# Patient Record
Sex: FEMALE | Race: OTHER | HISPANIC OR LATINO | URBAN - METROPOLITAN AREA
[De-identification: names, ages, dates, MRNs, and addresses within clinical notes are randomized per-mention and may not be internally consistent; named-entity substitution may affect disease eponyms.]

---

## 2017-12-27 PROBLEM — Z00.00 ENCOUNTER FOR PREVENTIVE HEALTH EXAMINATION: Status: ACTIVE | Noted: 2017-12-27

## 2018-11-05 ENCOUNTER — EMERGENCY (EMERGENCY)
Facility: HOSPITAL | Age: 38
LOS: 1 days | Discharge: HOME | End: 2018-11-05
Attending: EMERGENCY MEDICINE
Payer: MEDICAID

## 2018-11-05 VITALS
DIASTOLIC BLOOD PRESSURE: 65 MMHG | SYSTOLIC BLOOD PRESSURE: 111 MMHG | TEMPERATURE: 98 F | OXYGEN SATURATION: 100 % | RESPIRATION RATE: 18 BRPM | HEART RATE: 61 BPM

## 2018-11-05 VITALS — HEIGHT: 62 IN | WEIGHT: 123.9 LBS

## 2018-11-05 LAB
APPEARANCE UR: CLEAR — SIGNIFICANT CHANGE UP
BILIRUB UR-MCNC: NEGATIVE — SIGNIFICANT CHANGE UP
COLOR SPEC: YELLOW — SIGNIFICANT CHANGE UP
DIFF PNL FLD: NEGATIVE — SIGNIFICANT CHANGE UP
EPI CELLS # UR: SIGNIFICANT CHANGE UP
GLUCOSE UR QL: NEGATIVE MG/DL — SIGNIFICANT CHANGE UP
HCG UR QL: POSITIVE
KETONES UR-MCNC: NEGATIVE — SIGNIFICANT CHANGE UP
LEUKOCYTE ESTERASE UR-ACNC: NEGATIVE — SIGNIFICANT CHANGE UP
NITRITE UR-MCNC: NEGATIVE — SIGNIFICANT CHANGE UP
PH UR: 8 — SIGNIFICANT CHANGE UP (ref 5–8)
PROT UR-MCNC: 15 MG/DL
SP GR SPEC: 1.01 — SIGNIFICANT CHANGE UP (ref 1.01–1.02)
UROBILINOGEN FLD QL: 1 MG/DL
WBC UR QL: SIGNIFICANT CHANGE UP

## 2018-11-05 PROCEDURE — 81001 URINALYSIS AUTO W/SCOPE: CPT

## 2018-11-05 PROCEDURE — 87186 SC STD MICRODIL/AGAR DIL: CPT

## 2018-11-05 PROCEDURE — 99283 EMERGENCY DEPT VISIT LOW MDM: CPT

## 2018-11-05 PROCEDURE — 81025 URINE PREGNANCY TEST: CPT

## 2018-11-05 PROCEDURE — 87086 URINE CULTURE/COLONY COUNT: CPT

## 2018-11-05 PROCEDURE — T1013: CPT

## 2018-11-05 NOTE — ED ADULT NURSE NOTE - NSIMPLEMENTINTERV_GEN_ALL_ED
Implemented All Universal Safety Interventions:  [place] to call system. Call bell, personal items and telephone within reach. Instruct patient to call for assistance. Room bathroom lighting operational. Non-slip footwear when patient is off stretcher. Physically safe environment: no spills, clutter or unnecessary equipment. Stretcher in lowest position, wheels locked, appropriate side rails in place.

## 2018-11-05 NOTE — ED STATDOCS - NS_ ATTENDINGSCRIBEDETAILS _ED_A_ED_FT
I, Kwame Oconnor, performed the initial face to face bedside interview with this patient regarding history of present illness, review of symptoms and relevant past medical, social and family history.  I completed an independent physical examination.  I was the provider who initially evaluated this patient.  The history, relevant review of systems, past medical and surgical history, medical decision making, and physical examination was documented by the scribe in my presence and I attest to the accuracy of the documentation. Follow-up on ordered tests (ie labs, radiologic studies) and re-evaluation of the patient's status has been communicated to the ACP.  Disposition of the patient will be based on test outcome and response to ED interventions.

## 2018-11-05 NOTE — ED ADULT TRIAGE NOTE - CHIEF COMPLAINT QUOTE
Patient arrived to ED today with c/o UTI.  Patient was seen at clinic on Thursday, urine was collected, and patient was told to come to ED for treatment.  Patient states she is 7 weeks pregnant.  Patient c/o fever, back pain, and vomiting.

## 2018-11-05 NOTE — ED STATDOCS - OBJECTIVE STATEMENT
37 y/o F pt 7 weeks pregnant presents to ED sent by [facility] for urine infection. Pt c/o b/l flank pain x 2 weeks. Pt states she had subjective fever last week. Pt went to [facility] last week and was called last night to go to ED for UTI. Also had usual morning sickness. Denies dysuria. LMP . NKDA. Nonsmoker.    002  ; Lovely 37 y/o F pt 7 weeks pregnant presents to ED sent by [facility] for urine infection. Pt was seen last week at [facility]  with bl flnak pain for 2 weeks and feeling feverish on Mon, tues and Wed.  Had urine specimen which subsequently showed bacteria.  Reports no symptoms since visit to [facility] last week.  denies fever, dysuria, frequency.  received called last night advising to go to ED for UTI.  Experiencing morning sickness. LMP . NKDA. Nonsmoker.    002  ; Lovely

## 2018-11-05 NOTE — ED STATDOCS - PROGRESS NOTE DETAILS
PT evaluated by intake physician. HPI/PE/ROS as noted above. Will follow up plan per intake physician UA and urine culture results reviewed. No signs of infection. Abdomen soft, non-tender, no rebound or guarding. No b/l CVA tenderness.

## 2018-12-10 ENCOUNTER — APPOINTMENT (OUTPATIENT)
Dept: ANTEPARTUM | Facility: CLINIC | Age: 38
End: 2018-12-10
Payer: MEDICAID

## 2018-12-10 ENCOUNTER — ASOB RESULT (OUTPATIENT)
Age: 38
End: 2018-12-10

## 2018-12-10 PROCEDURE — 76801 OB US < 14 WKS SINGLE FETUS: CPT

## 2018-12-17 NOTE — ED ADULT NURSE NOTE - OBJECTIVE STATEMENT
Home Med List is complete. Source of medications in list is pt interview and review of patient's Rx bottles.     Patient states she took all her am meds today. She took a prn dose of pepcid this am.     Please note:  1. Removed from pt's med list: dexamethasone 1 mg tab (prior to labwork), hydrocortisone cream, tamadol 50 mg Q8H prn  2.  Added to pt's med list: famotidine    Ismael Gifford  PharmD Candidate 2019 12/16/2018 7:20 PM pt AOX4 was told to come to ED by clinic for urinary symptoms, pt states she had back pain but has resolved and had an abnormal result in the clinic.

## 2019-02-12 ENCOUNTER — APPOINTMENT (OUTPATIENT)
Dept: ANTEPARTUM | Facility: CLINIC | Age: 39
End: 2019-02-12

## 2019-02-25 ENCOUNTER — APPOINTMENT (OUTPATIENT)
Age: 39
End: 2019-02-25
Payer: MEDICAID

## 2019-02-25 ENCOUNTER — ASOB RESULT (OUTPATIENT)
Age: 39
End: 2019-02-25

## 2019-02-25 PROCEDURE — 76811 OB US DETAILED SNGL FETUS: CPT

## 2019-04-01 ENCOUNTER — APPOINTMENT (OUTPATIENT)
Dept: MATERNAL FETAL MEDICINE | Facility: CLINIC | Age: 39
End: 2019-04-01
Payer: MEDICAID

## 2019-04-01 ENCOUNTER — ASOB RESULT (OUTPATIENT)
Age: 39
End: 2019-04-01

## 2019-04-01 VITALS — WEIGHT: 153.5 LBS | HEIGHT: 60 IN | BODY MASS INDEX: 30.14 KG/M2

## 2019-04-01 PROCEDURE — G0108 DIAB MANAGE TRN  PER INDIV: CPT

## 2019-04-01 RX ORDER — URINE ACETONE TEST STRIPS
STRIP MISCELLANEOUS
Qty: 1 | Refills: 0 | Status: ACTIVE | COMMUNITY
Start: 2019-04-01 | End: 1900-01-01

## 2019-04-15 ENCOUNTER — APPOINTMENT (OUTPATIENT)
Dept: ANTEPARTUM | Facility: CLINIC | Age: 39
End: 2019-04-15

## 2019-04-15 ENCOUNTER — ASOB RESULT (OUTPATIENT)
Age: 39
End: 2019-04-15

## 2019-04-15 ENCOUNTER — APPOINTMENT (OUTPATIENT)
Dept: MATERNAL FETAL MEDICINE | Facility: CLINIC | Age: 39
End: 2019-04-15
Payer: MEDICAID

## 2019-04-15 ENCOUNTER — APPOINTMENT (OUTPATIENT)
Age: 39
End: 2019-04-15
Payer: MEDICAID

## 2019-04-15 VITALS
BODY MASS INDEX: 30.51 KG/M2 | HEIGHT: 60 IN | SYSTOLIC BLOOD PRESSURE: 90 MMHG | OXYGEN SATURATION: 98 % | HEART RATE: 90 BPM | DIASTOLIC BLOOD PRESSURE: 56 MMHG | WEIGHT: 155.38 LBS | RESPIRATION RATE: 16 BRPM

## 2019-04-15 DIAGNOSIS — O09.523 SUPERVISION OF ELDERLY MULTIGRAVIDA, THIRD TRIMESTER: ICD-10-CM

## 2019-04-15 DIAGNOSIS — O99.013 ANEMIA COMPLICATING PREGNANCY, THIRD TRIMESTER: ICD-10-CM

## 2019-04-15 DIAGNOSIS — O34.219 MATERNAL CARE FOR UNSPECIFIED TYPE SCAR FROM PREVIOUS CESAREAN DELIVERY: ICD-10-CM

## 2019-04-15 DIAGNOSIS — Z64.1 PROBLEMS RELATED TO MULTIPARITY: ICD-10-CM

## 2019-04-15 DIAGNOSIS — Z86.2 PERSONAL HISTORY OF DISEASES OF THE BLOOD AND BLOOD-FORMING ORGANS AND CERTAIN DISORDERS INVOLVING THE IMMUNE MECHANISM: ICD-10-CM

## 2019-04-15 DIAGNOSIS — O99.213 OBESITY COMPLICATING PREGNANCY, THIRD TRIMESTER: ICD-10-CM

## 2019-04-15 PROCEDURE — 76816 OB US FOLLOW-UP PER FETUS: CPT

## 2019-04-15 PROCEDURE — 99215 OFFICE O/P EST HI 40 MIN: CPT | Mod: TH

## 2019-04-15 PROCEDURE — 76819 FETAL BIOPHYS PROFIL W/O NST: CPT

## 2019-04-15 RX ORDER — CHLORHEXIDINE GLUCONATE 4 %
325 (65 FE) LIQUID (ML) TOPICAL
Refills: 0 | Status: ACTIVE | COMMUNITY

## 2019-04-15 RX ORDER — VITAMIN C, CALCIUM, IRON, VITAMIN D3, VITAMIN E, VITAMIN B1, VITAMIN B2, VITAMIN B3, VITAMIN B6, FOLIC ACID, IODINE, ZINC, COPPER, DOCUSATE SODIUM, DOCOSAHEXAENOIC ACID (DHA) 27-1-50 MG
KIT ORAL
Refills: 0 | Status: ACTIVE | COMMUNITY

## 2019-04-15 NOTE — DISCUSSION/SUMMARY
[FreeTextEntry1] : She is 31 weeks and 2 days gestation by her last menstrual period dates.\par \par Regarding her advanced maternal age, she did not have genetic counseling. She did not have prenatal diagnostic testing. She told me that she prenatal screen tests which were not available for my review. She told me that she had a detailed fetal anatomy ultrasound examination during the second trimester that reported normal fetal anatomy. I told her that advanced maternal age has been associated with a higher incidence of gestational diabetes, and preeclampsia /eclampsia. I also told her that advanced maternal age has been associated with an increased risk of stillbirth, and therefore, I recommend delivery during the 39 week of gestation in the event she has not given birth by 39 weeks of gestation. \par \par She is overweight and obesity has been associated with a number of maternal complications such as gestational diabetes, pre-eclampsia, thrombophlebitis, labor abnormalities, post-term pregnancies,  delivery, and operative complications. Obesity has been associated with adverse fetal outcomes such as late stillbirth and  deliveries.  Obese women also have a two to three-fold increased incidence in congenital anomalies. There were no major fetal malformations seen during the fetal anatomy ultrasound examination. \par \par Regarding her gestational diabetes, she states that she has been following a diabetic diet. She performs fasting and 1 hour postprandial self glucose monitoring.  My review of her log book from 19 to 4/15/19 revealed all glucose values to be within normal limits. She appears to be in good glycemic control. I informed her that maintaining euglycemia is the most important factor associated with good  outcomes in pregnancies complicated by gestational diabetes. I told her that poor glucose control may cause fetal macrosomia, shoulder dystocia,  delivery,  respiratory distress syndrome and  metabolic complications such as hypoglycemia and hyperbilirubinemia.  I told her to eat three daily meals with 3 snacks to reduce postprandial glucose fluctuations. I told her that she is at risk for developing gestational hypertension and preeclampsia during the current pregnancy. I also told her that she is at risk for developing type 2 diabetes, metabolic syndrome and cardiovascular disease later in life.  I also recommend a 75 gram 2 hour OGTT approximately 6 - 8 weeks postpartum to determine whether she has impaired glucose tolerance or preexisting diabetes not diagnosed prior to the pregnancy. I made arrangements for her to see our diabetes educator on 19.\par \par Regarding her anemia during pregnancy, I advised her to continue taking her prenatal vitamin and the prescribed iron supplementation. She was advised to increase her vitamin C intake. I told her that red meat, poultry, and fish are dietary sources of iron. I told her that anemia during pregnancy increases the risk of having a  delivery or a low birth weight baby.\par \par She had 2  sections and she was advised to have a repeat  section delivery.  I told her that multiple repeat  deliveries can result in serious maternal morbidity.  I told her that she is at risk for dense adhesions, more difficult surgery, placenta accreta, bowel injury, cystotomy, ureteral injury, ileus, hysterectomy, blood transfusions, intensive care unit admission, and  delivery.\par \par She is multiparous and we discussed the various methods available for contraception. She told me that she wants to have an operative sterilization procedure after the  delivery. \par \par I recommend the Tdap vaccine between 27 and 36 weeks of gestation to prevent pertussis infection in the  infant. I recommend the flu vaccine during flu season (October through May). She should have serial ultrasounds to evaluate fetal growth and development.  I also suggest fetal surveillance during the third trimester of pregnancy with weekly NSTs or BPPs starting at 36 weeks gestation.  She can also perform daily fetal movement counts as an adjunct to the NSTs or BPPs.\par \par

## 2019-04-15 NOTE — OB HISTORY
[Pregnancy History] : patient received anesthesia [Spontaneous] : Spontaneous conception [Sonogram] : sonogram [at ___ wks] : at [unfilled] weeks [Definite:  ___ (Date)] : the last menstrual period was [unfilled] [___] : no pregnancy complications reported [LAURI: ___] : LAURI: [unfilled] [LMP: ___] : LMP: [unfilled] [EGA: ___ wks] : EGA: [unfilled] wks [FreeTextEntry1] : Her prenatal records were not available for my review. She told me that she started her prenatal care at the [facility] and transferred her care to Dr. White during February 2019.\par \par A three-hour glucose tolerance test done on March 16, 2019 reported a fasting glucose of 79, one-hour glucose of 189, two-hour glucose of 157, and three-hour glucose of 100. There were 2 abnormal glucose values consistent with the diagnosis of gestational diabetes. She was seen by our diabetes educator on April 1, 2019 for initial diabetes education and counseling.\par \par She was told she had anemia during pregnancy at the [facility]. She was started on daily iron supplementation.

## 2019-04-15 NOTE — VITALS
[LMP (date): ___] : LMP was on [unfilled] [GA =___ Weeks] : which calculates to a GA of [unfilled] weeks [LAURI by LMP (date): ___] : The calculated LAURI by LMP is [unfilled] [By LMP] : this is the final LAURI [US Date: ___] : ultrasound performed on [unfilled]. [GA= ___ Weeks] : Results were GA of [unfilled] weeks [GA= ___ Days] : and [unfilled] day(s) [LAURI by US (date): ___] : The calculated LAURI by US is [unfilled]

## 2019-04-15 NOTE — SURGICAL HISTORY
[Last Pap: ___] : Last Pap: [unfilled] [Fibroids] : no fibroids [Abn Paps] : no abnormal pap smears [Infertility] : no infertility [Breast Disease] : no breast disease [STI's] : no STI's [Cysts] : no cysts [OC Use] : no OC use [Last Mammo: ___] : Last Mammo: none

## 2019-04-15 NOTE — FAMILY HISTORY
[Age 35+ During Pregnancy] : not 35 or over during pregnancy [Reported Family History Of Birth Defects] : no congenital heart defects [Puma-Sachs Carrier] : no Puma-Sachs [Family History] : no mental retardation/autism [Reported Family History Of Genetic Disease] : no history of child defect in child of baby father

## 2019-04-15 NOTE — LETTER CLOSING
[If you have any questions, please do not hesitate to contact our office.] : If you have any questions, please do not hesitate to contact our office. [Thank you very much for allowing me to participate in the care of this patient.] : Thank you very much for allowing me to participate in the care of this patient [Sincerely,] : Sincerely,

## 2019-04-29 ENCOUNTER — APPOINTMENT (OUTPATIENT)
Age: 39
End: 2019-04-29
Payer: MEDICAID

## 2019-04-29 ENCOUNTER — OUTPATIENT (OUTPATIENT)
Dept: OUTPATIENT SERVICES | Facility: HOSPITAL | Age: 39
LOS: 1 days | End: 2019-04-29
Payer: COMMERCIAL

## 2019-04-29 ENCOUNTER — ASOB RESULT (OUTPATIENT)
Age: 39
End: 2019-04-29

## 2019-04-29 VITALS
SYSTOLIC BLOOD PRESSURE: 109 MMHG | HEART RATE: 83 BPM | RESPIRATION RATE: 18 BRPM | TEMPERATURE: 99 F | DIASTOLIC BLOOD PRESSURE: 67 MMHG

## 2019-04-29 VITALS — HEART RATE: 80 BPM | DIASTOLIC BLOOD PRESSURE: 64 MMHG | SYSTOLIC BLOOD PRESSURE: 101 MMHG

## 2019-04-29 VITALS — BODY MASS INDEX: 30.64 KG/M2 | HEIGHT: 60 IN | WEIGHT: 156.06 LBS

## 2019-04-29 DIAGNOSIS — O47.03 FALSE LABOR BEFORE 37 COMPLETED WEEKS OF GESTATION, THIRD TRIMESTER: ICD-10-CM

## 2019-04-29 LAB — GLUCOSE BLDC GLUCOMTR-MCNC: 87 MG/DL — SIGNIFICANT CHANGE UP (ref 70–99)

## 2019-04-29 PROCEDURE — G0108 DIAB MANAGE TRN  PER INDIV: CPT

## 2019-04-29 PROCEDURE — 82962 GLUCOSE BLOOD TEST: CPT

## 2019-04-29 PROCEDURE — G0463: CPT

## 2019-04-29 PROCEDURE — T1013: CPT

## 2019-04-29 PROCEDURE — 59025 FETAL NON-STRESS TEST: CPT

## 2019-04-29 NOTE — OB PROVIDER TRIAGE NOTE - HISTORY OF PRESENT ILLNESS
37yo  @ 34.2w who comes into triage from the office for non-reactive tracing in the office. Pt denies any vaginal bleeding, loss of fluids, contractions. Pt reports +FM. Pt pregnancy is complicated by GDM.

## 2019-04-29 NOTE — OB PROVIDER TRIAGE NOTE - NSOBPROVIDERNOTE_OBGYN_ALL_OB_FT
37yo  @ 34.2w here for nonreactive tracing. Tracing reactive in triage. Pt can followup with office at next scheduled visit. 37yo  @ 34.2w here for nonreactive tracing. Tracing reactive in triage. Pt can followup with office at next scheduled visit.    Attending  pt was sent from the office due to a nonreactive NST  NST is reactive  pt will be dc home  [facility]

## 2019-05-13 ENCOUNTER — APPOINTMENT (OUTPATIENT)
Dept: MATERNAL FETAL MEDICINE | Facility: CLINIC | Age: 39
End: 2019-05-13
Payer: MEDICAID

## 2019-05-13 ENCOUNTER — APPOINTMENT (OUTPATIENT)
Age: 39
End: 2019-05-13
Payer: MEDICAID

## 2019-05-13 ENCOUNTER — APPOINTMENT (OUTPATIENT)
Dept: MATERNAL FETAL MEDICINE | Facility: CLINIC | Age: 39
End: 2019-05-13

## 2019-05-13 ENCOUNTER — ASOB RESULT (OUTPATIENT)
Age: 39
End: 2019-05-13

## 2019-05-13 VITALS
BODY MASS INDEX: 31.48 KG/M2 | SYSTOLIC BLOOD PRESSURE: 102 MMHG | HEART RATE: 72 BPM | WEIGHT: 160.31 LBS | RESPIRATION RATE: 18 BRPM | OXYGEN SATURATION: 98 % | DIASTOLIC BLOOD PRESSURE: 66 MMHG | HEIGHT: 60 IN

## 2019-05-13 DIAGNOSIS — O24.410 GESTATIONAL DIABETES MELLITUS IN PREGNANCY, DIET CONTROLLED: ICD-10-CM

## 2019-05-13 DIAGNOSIS — Z3A.35 35 WEEKS GESTATION OF PREGNANCY: ICD-10-CM

## 2019-05-13 PROCEDURE — 76820 UMBILICAL ARTERY ECHO: CPT

## 2019-05-13 PROCEDURE — 93976 VASCULAR STUDY: CPT

## 2019-05-13 PROCEDURE — 99214 OFFICE O/P EST MOD 30 MIN: CPT | Mod: TH

## 2019-05-13 PROCEDURE — 76816 OB US FOLLOW-UP PER FETUS: CPT

## 2019-05-13 PROCEDURE — 76819 FETAL BIOPHYS PROFIL W/O NST: CPT

## 2019-05-13 PROCEDURE — 76821 MIDDLE CEREBRAL ARTERY ECHO: CPT

## 2019-05-13 RX ORDER — LANCETS
EACH MISCELLANEOUS
Qty: 1 | Refills: 2 | Status: ACTIVE | COMMUNITY
Start: 2019-04-01 | End: 1900-01-01

## 2019-05-13 RX ORDER — BLOOD SUGAR DIAGNOSTIC
STRIP MISCELLANEOUS
Qty: 1 | Refills: 2 | Status: ACTIVE | COMMUNITY
Start: 2019-04-01 | End: 1900-01-01

## 2019-05-13 NOTE — OB HISTORY
[Pregnancy History] : patient received anesthesia [LMP: ___] : LMP: [unfilled] [LAURI: ___] : LAURI: [unfilled] [Spontaneous] : Spontaneous conception [Sonogram] : sonogram [at ___ wks] : at [unfilled] weeks [Definite:  ___ (Date)] : the last menstrual period was [unfilled] [___] : no pregnancy complications reported [EGA: ___ wks] : EGA: [unfilled] wks [FreeTextEntry1] : Her prenatal records were not available for my review. She told me that she started her prenatal care at the [facility] and transferred her care to Dr. White during February 2019.\par \par A three-hour glucose tolerance test done on March 16, 2019 reported a fasting glucose of 79, one-hour glucose of 189, two-hour glucose of 157, and three-hour glucose of 100. There were 2 abnormal glucose values consistent with the diagnosis of gestational diabetes. She was seen by our diabetes educator on April 1, 2019 for initial diabetes education and counseling. She had a maternal fetal medicine consultation with me on April  15, 2019 for her gestational diabetes. She was also seen by our diabetes educator and myself on April 29, 2019.\par \par She was told she had anemia during pregnancy at the [facility]. She was started on daily iron supplementation.

## 2019-05-13 NOTE — FAMILY HISTORY
[Age 35+ During Pregnancy] : not 35 or over during pregnancy [Reported Family History Of Birth Defects] : no neural tube defect [Puma-Sachs Carrier] : no Puma-Sachs [Family History] : no mental retardation/autism [Reported Family History Of Genetic Disease] : no maternal metabolic disorder

## 2019-05-13 NOTE — PAST MEDICAL HISTORY
[HIV Infection] : no HIV [Exposure To Gonorrhea] : no gonorrhea [Herpes Simplex] : no genital herpes [Chlamydial Infections] : no chlamydia [Syphilis] : no syphilis [Hepatitis, B Virus] : no Hepatitis B [Human Papilloma Virus Infection] : no genital warts [Hepatitis, C Virus] : no Hepatitis C [Trichomoniasis] : no trichomoniasis

## 2019-05-13 NOTE — DISCUSSION/SUMMARY
[FreeTextEntry1] : She is 35 weeks and 2 days gestation by her last menstrual period dates.\par \par Regarding her gestational diabetes, she states that she has been following a diabetic diet. She performs fasting and 1 hour postprandial self glucose monitoring.  My review of her log book from 19 to 19 revealed all glucose values to be within normal limits. She appears to be in good glycemic control. I again informed her that maintaining euglycemia is the most important factor associated with good  outcomes in pregnancies complicated by gestational diabetes. I told her that poor glucose control may cause fetal macrosomia, shoulder dystocia,  delivery,  respiratory distress syndrome and  metabolic complications such as hypoglycemia and hyperbilirubinemia.  I advised her to continue to eat three daily meals with 3 snacks to reduce postprandial glucose fluctuations. I again told her that she is at risk for developing gestational hypertension and preeclampsia during the current pregnancy.  She was advised to have a 75 gram 2 hour OGTT approximately 6 - 8 weeks postpartum to determine whether she has impaired glucose tolerance or preexisting diabetes not diagnosed prior to the pregnancy. I ordered a hemoglobin A1c level. She was advised to see the diabetes educator in approximately 2-3 weeks. \par \par \par \par \par

## 2019-05-13 NOTE — SURGICAL HISTORY
[Last Pap: ___] : Last Pap: [unfilled] [Fibroids] : no fibroids [Breast Disease] : no breast disease [Abn Paps] : no abnormal pap smears [STI's] : no STI's [Infertility] : no infertility [Cysts] : no cysts [Last Mammo: ___] : Last Mammo: none [OC Use] : no OC use

## 2019-05-13 NOTE — ACTIVE PROBLEMS
[Diabetes Mellitus] : no diabetes mellitus [Hypertension] : no hypertension [Heart Disease] : no heart disease [Renal Disease] : no kidney disease, no UTI [Autoimmune Disease] : no autoimmune disease [Depression] : no depression, no post partum depression [Psychiatric Disorders] : no psychiatric disorders [Neurologic Disorder] : no neurologic disorder, no epilepsy [Hepatic Disorder] : no hepatitis, no liver disease [Thrombophlebitis] : no varicosities, no phlebitis [Thyroid Disorder] : no thyroid dysfunction [Trauma] : no trauma/violence [Blood Transfusion (___ Ml)] : no history of blood transfusion

## 2019-05-14 LAB
ESTIMATED AVERAGE GLUCOSE: 94 MG/DL
HBA1C MFR BLD HPLC: 4.9 %

## 2019-05-16 ENCOUNTER — OUTPATIENT (OUTPATIENT)
Dept: OUTPATIENT SERVICES | Facility: HOSPITAL | Age: 39
LOS: 1 days | End: 2019-05-16
Payer: COMMERCIAL

## 2019-05-16 VITALS
HEART RATE: 79 BPM | RESPIRATION RATE: 16 BRPM | DIASTOLIC BLOOD PRESSURE: 54 MMHG | TEMPERATURE: 98 F | SYSTOLIC BLOOD PRESSURE: 99 MMHG

## 2019-05-16 VITALS
SYSTOLIC BLOOD PRESSURE: 101 MMHG | RESPIRATION RATE: 16 BRPM | DIASTOLIC BLOOD PRESSURE: 57 MMHG | TEMPERATURE: 98 F | HEART RATE: 80 BPM

## 2019-05-16 DIAGNOSIS — O26.893 OTHER SPECIFIED PREGNANCY RELATED CONDITIONS, THIRD TRIMESTER: ICD-10-CM

## 2019-05-16 PROCEDURE — 59050 FETAL MONITOR W/REPORT: CPT

## 2019-05-16 PROCEDURE — 59025 FETAL NON-STRESS TEST: CPT

## 2019-05-16 PROCEDURE — G0463: CPT

## 2019-05-16 NOTE — OB PROVIDER TRIAGE NOTE - HISTORY OF PRESENT ILLNESS
Pt is a 37yo  at 35w5d presented for decreased fetal movement from doctors office,   in the LND triage she endorses fetal movement and denies any complications of pregnancy.    Vital Signs Last 24 Hrs  T(C): 36.8 (16 May 2019 20:53), Max: 36.8 (16 May 2019 20:53)  T(F): 98.2 (16 May 2019 20:53), Max: 98.2 (16 May 2019 20:53)  HR: 80 (16 May 2019 21:45) (79 - 80)  BP: 101/57 (16 May 2019 21:45) (99/54 - 101/57)  RR: 16 (16 May 2019 20:53) (16 - 16)  [place]: no contractions  FHT: 140, moderate variability, + accels, no decels; reactive.    A/P 37yo  at 33w3d   -endorses good fetal movement  -pt is stable and is being discharged to follow with her regular pernatal visits

## 2019-05-16 NOTE — OB PROVIDER TRIAGE NOTE - NS ED NOTE AC HIGH RISK COUNTRIES
Acute Headache   WHAT YOU NEED TO KNOW:   An acute headache is pain or discomfort that starts suddenly and gets worse quickly  You may have an acute headache only when you feel stress or eat certain foods  Other acute headache pain can happen every day, and sometimes several times a day  DISCHARGE INSTRUCTIONS:   Return to the emergency department if:   · You have severe pain  · You have numbness or weakness on one side of your face or body  · You have a headache that occurs after a blow to the head, a fall, or other trauma  · You have a headache, are forgetful or confused, or have trouble speaking  · You have a headache, stiff neck, and a fever  Contact your healthcare provider if:   · You have a constant headache and are vomiting  · You have a headache each day that does not get better, even after treatment  · You have changes in your headaches, or new symptoms that occur when you have a headache  · You have questions or concerns about your condition or care  Medicines: You may need any of the following:  · Prescription pain medicine  may be given  The medicine your healthcare provider recommends will depend on the kind of headaches you have  You will need to take prescription headache medicines as directed to prevent a problem called rebound headache  These headaches happen with regular use of pain relievers for headache disorders  · NSAIDs , such as ibuprofen, help decrease swelling, pain, and fever  This medicine is available with or without a doctor's order  NSAIDs can cause stomach bleeding or kidney problems in certain people  If you take blood thinner medicine, always ask your healthcare provider if NSAIDs are safe for you  Always read the medicine label and follow directions  · Acetaminophen  decreases pain and fever  It is available without a doctor's order  Ask how much to take and how often to take it  Follow directions   Read the labels of all other medicines you are No using to see if they also contain acetaminophen, or ask your doctor or pharmacist  Acetaminophen can cause liver damage if not taken correctly  Do not use more than 3 grams (3,000 milligrams) total of acetaminophen in one day  · Antidepressants  may be given for some kinds of headaches  · Take your medicine as directed  Contact your healthcare provider if you think your medicine is not helping or if you have side effects  Tell him or her if you are allergic to any medicine  Keep a list of the medicines, vitamins, and herbs you take  Include the amounts, and when and why you take them  Bring the list or the pill bottles to follow-up visits  Carry your medicine list with you in case of an emergency  Manage your symptoms:   · Apply heat or ice  on the headache area  Use a heat or ice pack  For an ice pack, you can also put crushed ice in a plastic bag  Cover the pack or bag with a towel before you apply it to your skin  Ice and heat both help decrease pain, and heat also helps decrease muscle spasms  Apply heat for 20 to 30 minutes every 2 hours  Apply ice for 15 to 20 minutes every hour  Apply heat or ice for as long and for as many days as directed  You may alternate heat and ice  · Relax your muscles  Lie down in a comfortable position and close your eyes  Relax your muscles slowly  Start at your toes and work your way up your body  · Keep a record of your headaches  Write down when your headaches start and stop  Include your symptoms and what you were doing when the headache began  Record what you ate or drank for 24 hours before the headache started  Describe the pain and where it hurts  Keep track of what you did to treat your headache and if it worked  Prevent an acute headache:   · Avoid anything that triggers an acute headache  Examples include exposure to chemicals, going to high altitude, or not getting enough sleep  Create a regular sleep routine   Go to sleep at the same time and wake up at the same time each day  Do not use electronic devices before bedtime  These may trigger a headache or prevent you from sleeping well  · Do not smoke  Nicotine and other chemicals in cigarettes and cigars can trigger an acute headache or make it worse  Ask your healthcare provider for information if you currently smoke and need help to quit  E-cigarettes or smokeless tobacco still contain nicotine  Talk to your healthcare provider before you use these products  · Limit alcohol as directed  Alcohol can trigger an acute headache or make it worse  If you have cluster headaches, do not drink alcohol during an episode  For other types of headaches, ask your healthcare provider if it is safe for you to drink alcohol  Ask how much is safe for you to drink, and how often  · Exercise as directed  Exercise can reduce tension and help with headache pain  Aim for 30 minutes of physical activity on most days of the week  Your healthcare provider can help you create an exercise plan  · Eat a variety of healthy foods  Healthy foods include fruits, vegetables, low-fat dairy products, lean meats, fish, whole grains, and cooked beans  Your healthcare provider or dietitian can help you create meals plans if you need to avoid foods that trigger headaches  Follow up with your healthcare provider as directed:  Bring your headache record with you when you see your healthcare provider  Write down your questions so you remember to ask them during your visits  © 2017 [address] [facility] Information is for End User's use only and may not be sold, redistributed or otherwise used for commercial purposes  All illustrations and images included in CareNotes® are the copyrighted property of A D A M , Inc  or Ayden Simpson  The above information is an  only  It is not intended as medical advice for individual conditions or treatments   Talk to your doctor, nurse or pharmacist before following any medical regimen to see if it is safe and effective for you

## 2019-05-28 PROBLEM — O24.419 GESTATIONAL DIABETES MELLITUS IN PREGNANCY, UNSPECIFIED CONTROL: Chronic | Status: ACTIVE | Noted: 2019-05-16

## 2019-05-30 ENCOUNTER — OUTPATIENT (OUTPATIENT)
Dept: OUTPATIENT SERVICES | Facility: HOSPITAL | Age: 39
LOS: 1 days | End: 2019-05-30
Payer: COMMERCIAL

## 2019-05-30 DIAGNOSIS — Z01.818 ENCOUNTER FOR OTHER PREPROCEDURAL EXAMINATION: ICD-10-CM

## 2019-05-30 LAB
ANISOCYTOSIS BLD QL: SLIGHT — SIGNIFICANT CHANGE UP
APPEARANCE UR: CLEAR — SIGNIFICANT CHANGE UP
BACTERIA # UR AUTO: NEGATIVE — SIGNIFICANT CHANGE UP
BASOPHILS # BLD AUTO: 0 K/UL — SIGNIFICANT CHANGE UP (ref 0–0.2)
BASOPHILS NFR BLD AUTO: 0.2 % — SIGNIFICANT CHANGE UP (ref 0–2)
BILIRUB UR-MCNC: NEGATIVE — SIGNIFICANT CHANGE UP
BLD GP AB SCN SERPL QL: SIGNIFICANT CHANGE UP
COLOR SPEC: YELLOW — SIGNIFICANT CHANGE UP
DACRYOCYTES BLD QL SMEAR: SLIGHT — SIGNIFICANT CHANGE UP
DIFF PNL FLD: NEGATIVE — SIGNIFICANT CHANGE UP
EOSINOPHIL # BLD AUTO: 0 K/UL — SIGNIFICANT CHANGE UP (ref 0–0.5)
EOSINOPHIL NFR BLD AUTO: 0.5 % — SIGNIFICANT CHANGE UP (ref 0–6)
EPI CELLS # UR: SIGNIFICANT CHANGE UP
GLUCOSE UR QL: NEGATIVE MG/DL — SIGNIFICANT CHANGE UP
HCT VFR BLD CALC: 34 % — LOW (ref 37–47)
HGB BLD-MCNC: 11 G/DL — LOW (ref 12–16)
HIV 1 & 2 AB SERPL IA.RAPID: SIGNIFICANT CHANGE UP
KETONES UR-MCNC: NEGATIVE — SIGNIFICANT CHANGE UP
LEUKOCYTE ESTERASE UR-ACNC: ABNORMAL
LYMPHOCYTES # BLD AUTO: 18.5 % — LOW (ref 20–55)
LYMPHOCYTES # BLD AUTO: 2 K/UL — SIGNIFICANT CHANGE UP (ref 1–4.8)
MACROCYTES BLD QL: SLIGHT — SIGNIFICANT CHANGE UP
MCHC RBC-ENTMCNC: 21.5 PG — LOW (ref 27–31)
MCHC RBC-ENTMCNC: 32.4 G/DL — SIGNIFICANT CHANGE UP (ref 32–36)
MCV RBC AUTO: 66.5 FL — LOW (ref 81–99)
MICROCYTES BLD QL: SLIGHT — SIGNIFICANT CHANGE UP
MONOCYTES # BLD AUTO: 1.1 K/UL — HIGH (ref 0–0.8)
MONOCYTES NFR BLD AUTO: 10.1 % — HIGH (ref 3–10)
NEUTROPHILS # BLD AUTO: 7 K/UL — SIGNIFICANT CHANGE UP (ref 1.8–8)
NEUTROPHILS NFR BLD AUTO: 66.4 % — SIGNIFICANT CHANGE UP (ref 37–73)
NITRITE UR-MCNC: NEGATIVE — SIGNIFICANT CHANGE UP
OVALOCYTES BLD QL SMEAR: SLIGHT — SIGNIFICANT CHANGE UP
PH UR: 7 — SIGNIFICANT CHANGE UP (ref 5–8)
PLAT MORPH BLD: NORMAL — SIGNIFICANT CHANGE UP
PLATELET # BLD AUTO: 187 K/UL — SIGNIFICANT CHANGE UP (ref 150–400)
POIKILOCYTOSIS BLD QL AUTO: SLIGHT — SIGNIFICANT CHANGE UP
PROT UR-MCNC: NEGATIVE MG/DL — SIGNIFICANT CHANGE UP
RBC # BLD: 5.11 M/UL — SIGNIFICANT CHANGE UP (ref 4.4–5.2)
RBC # FLD: 16.2 % — HIGH (ref 11–15.6)
RBC BLD AUTO: ABNORMAL
RBC CASTS # UR COMP ASSIST: NEGATIVE /HPF — SIGNIFICANT CHANGE UP (ref 0–4)
SP GR SPEC: 1 — LOW (ref 1.01–1.02)
TYPE + AB SCN PNL BLD: SIGNIFICANT CHANGE UP
UROBILINOGEN FLD QL: NEGATIVE MG/DL — SIGNIFICANT CHANGE UP
WBC # BLD: 10.6 K/UL — SIGNIFICANT CHANGE UP (ref 4.8–10.8)
WBC # FLD AUTO: 10.6 K/UL — SIGNIFICANT CHANGE UP (ref 4.8–10.8)
WBC UR QL: SIGNIFICANT CHANGE UP

## 2019-05-30 PROCEDURE — 86780 TREPONEMA PALLIDUM: CPT

## 2019-05-30 PROCEDURE — 81001 URINALYSIS AUTO W/SCOPE: CPT

## 2019-05-30 PROCEDURE — 86900 BLOOD TYPING SEROLOGIC ABO: CPT

## 2019-05-30 PROCEDURE — 87389 HIV-1 AG W/HIV-1&-2 AB AG IA: CPT

## 2019-05-30 PROCEDURE — 86850 RBC ANTIBODY SCREEN: CPT

## 2019-05-30 PROCEDURE — 85027 COMPLETE CBC AUTOMATED: CPT

## 2019-05-30 PROCEDURE — 36415 COLL VENOUS BLD VENIPUNCTURE: CPT

## 2019-05-30 PROCEDURE — 86901 BLOOD TYPING SEROLOGIC RH(D): CPT

## 2019-05-30 PROCEDURE — 86703 HIV-1/HIV-2 1 RESULT ANTBDY: CPT

## 2019-05-31 LAB
HIV 1+2 AB+HIV1 P24 AG SERPL QL IA: SIGNIFICANT CHANGE UP
T PALLIDUM AB TITR SER: NEGATIVE — SIGNIFICANT CHANGE UP

## 2019-06-01 ENCOUNTER — TRANSCRIPTION ENCOUNTER (OUTPATIENT)
Age: 39
End: 2019-06-01

## 2019-06-02 ENCOUNTER — RESULT REVIEW (OUTPATIENT)
Age: 39
End: 2019-06-02

## 2019-06-02 ENCOUNTER — INPATIENT (INPATIENT)
Facility: HOSPITAL | Age: 39
LOS: 2 days | Discharge: HOME | End: 2019-06-05
Attending: OBSTETRICS & GYNECOLOGY | Admitting: OBSTETRICS & GYNECOLOGY
Payer: COMMERCIAL

## 2019-06-02 ENCOUNTER — TRANSCRIPTION ENCOUNTER (OUTPATIENT)
Age: 39
End: 2019-06-02

## 2019-06-02 DIAGNOSIS — O34.219 MATERNAL CARE FOR UNSPECIFIED TYPE SCAR FROM PREVIOUS CESAREAN DELIVERY: ICD-10-CM

## 2019-06-02 LAB — GLUCOSE BLDC GLUCOMTR-MCNC: 78 MG/DL — SIGNIFICANT CHANGE UP (ref 70–99)

## 2019-06-02 PROCEDURE — 88302 TISSUE EXAM BY PATHOLOGIST: CPT | Mod: 26

## 2019-06-02 PROCEDURE — 88304 TISSUE EXAM BY PATHOLOGIST: CPT | Mod: 26

## 2019-06-02 RX ORDER — NALOXONE HYDROCHLORIDE 4 MG/.1ML
0.1 SPRAY NASAL
Refills: 0 | Status: DISCONTINUED | OUTPATIENT
Start: 2019-06-02 | End: 2019-06-05

## 2019-06-02 RX ORDER — CITRIC ACID/SODIUM CITRATE 300-500 MG
30 SOLUTION, ORAL ORAL ONCE
Refills: 0 | Status: COMPLETED | OUTPATIENT
Start: 2019-06-02 | End: 2019-06-02

## 2019-06-02 RX ORDER — SODIUM CHLORIDE 9 MG/ML
1000 INJECTION, SOLUTION INTRAVENOUS ONCE
Refills: 0 | Status: COMPLETED | OUTPATIENT
Start: 2019-06-02 | End: 2019-06-02

## 2019-06-02 RX ORDER — OXYTOCIN 10 UNIT/ML
333.33 VIAL (ML) INJECTION
Qty: 20 | Refills: 0 | Status: DISCONTINUED | OUTPATIENT
Start: 2019-06-02 | End: 2019-06-02

## 2019-06-02 RX ORDER — TETANUS TOXOID, REDUCED DIPHTHERIA TOXOID AND ACELLULAR PERTUSSIS VACCINE, ADSORBED 5; 2.5; 8; 8; 2.5 [IU]/.5ML; [IU]/.5ML; UG/.5ML; UG/.5ML; UG/.5ML
0.5 SUSPENSION INTRAMUSCULAR ONCE
Refills: 0 | Status: COMPLETED | OUTPATIENT
Start: 2019-06-02

## 2019-06-02 RX ORDER — IBUPROFEN 200 MG
600 TABLET ORAL EVERY 6 HOURS
Refills: 0 | Status: COMPLETED | OUTPATIENT
Start: 2019-06-02 | End: 2020-04-30

## 2019-06-02 RX ORDER — MAGNESIUM HYDROXIDE 400 MG/1
30 TABLET, CHEWABLE ORAL
Refills: 0 | Status: DISCONTINUED | OUTPATIENT
Start: 2019-06-02 | End: 2019-06-05

## 2019-06-02 RX ORDER — KETOROLAC TROMETHAMINE 30 MG/ML
30 SYRINGE (ML) INJECTION EVERY 6 HOURS
Refills: 0 | Status: DISCONTINUED | OUTPATIENT
Start: 2019-06-02 | End: 2019-06-05

## 2019-06-02 RX ORDER — DOCUSATE SODIUM 100 MG
100 CAPSULE ORAL
Refills: 0 | Status: DISCONTINUED | OUTPATIENT
Start: 2019-06-02 | End: 2019-06-05

## 2019-06-02 RX ORDER — KETOROLAC TROMETHAMINE 30 MG/ML
30 SYRINGE (ML) INJECTION EVERY 6 HOURS
Refills: 0 | Status: DISCONTINUED | OUTPATIENT
Start: 2019-06-02 | End: 2019-06-03

## 2019-06-02 RX ORDER — FAMOTIDINE 10 MG/ML
20 INJECTION INTRAVENOUS ONCE
Refills: 0 | Status: DISCONTINUED | OUTPATIENT
Start: 2019-06-02 | End: 2019-06-02

## 2019-06-02 RX ORDER — OXYCODONE HYDROCHLORIDE 5 MG/1
5 TABLET ORAL
Refills: 0 | Status: COMPLETED | OUTPATIENT
Start: 2019-06-02 | End: 2019-06-09

## 2019-06-02 RX ORDER — SIMETHICONE 80 MG/1
80 TABLET, CHEWABLE ORAL EVERY 4 HOURS
Refills: 0 | Status: DISCONTINUED | OUTPATIENT
Start: 2019-06-02 | End: 2019-06-05

## 2019-06-02 RX ORDER — SODIUM CHLORIDE 9 MG/ML
1000 INJECTION, SOLUTION INTRAVENOUS
Refills: 0 | Status: DISCONTINUED | OUTPATIENT
Start: 2019-06-02 | End: 2019-06-05

## 2019-06-02 RX ORDER — GLYCERIN ADULT
1 SUPPOSITORY, RECTAL RECTAL AT BEDTIME
Refills: 0 | Status: COMPLETED | OUTPATIENT
Start: 2019-06-02 | End: 2019-06-03

## 2019-06-02 RX ORDER — ENOXAPARIN SODIUM 100 MG/ML
40 INJECTION SUBCUTANEOUS EVERY 24 HOURS
Refills: 0 | Status: DISCONTINUED | OUTPATIENT
Start: 2019-06-02 | End: 2019-06-05

## 2019-06-02 RX ORDER — DIPHENHYDRAMINE HCL 50 MG
50 CAPSULE ORAL EVERY 4 HOURS
Refills: 0 | Status: DISCONTINUED | OUTPATIENT
Start: 2019-06-02 | End: 2019-06-05

## 2019-06-02 RX ORDER — LANOLIN
1 OINTMENT (GRAM) TOPICAL EVERY 6 HOURS
Refills: 0 | Status: DISCONTINUED | OUTPATIENT
Start: 2019-06-02 | End: 2019-06-05

## 2019-06-02 RX ORDER — ACETAMINOPHEN 500 MG
1000 TABLET ORAL ONCE
Refills: 0 | Status: COMPLETED | OUTPATIENT
Start: 2019-06-02 | End: 2019-06-02

## 2019-06-02 RX ORDER — ACETAMINOPHEN 500 MG
975 TABLET ORAL EVERY 6 HOURS
Refills: 0 | Status: DISCONTINUED | OUTPATIENT
Start: 2019-06-02 | End: 2019-06-05

## 2019-06-02 RX ORDER — OXYCODONE HYDROCHLORIDE 5 MG/1
5 TABLET ORAL ONCE
Refills: 0 | Status: DISCONTINUED | OUTPATIENT
Start: 2019-06-02 | End: 2019-06-05

## 2019-06-02 RX ORDER — METOCLOPRAMIDE HCL 10 MG
10 TABLET ORAL ONCE
Refills: 0 | Status: DISCONTINUED | OUTPATIENT
Start: 2019-06-02 | End: 2019-06-02

## 2019-06-02 RX ORDER — DIPHENHYDRAMINE HCL 50 MG
25 CAPSULE ORAL EVERY 6 HOURS
Refills: 0 | Status: DISCONTINUED | OUTPATIENT
Start: 2019-06-02 | End: 2019-06-05

## 2019-06-02 RX ORDER — CEFAZOLIN SODIUM 1 G
2000 VIAL (EA) INJECTION ONCE
Refills: 0 | Status: DISCONTINUED | OUTPATIENT
Start: 2019-06-02 | End: 2019-06-05

## 2019-06-02 RX ORDER — SODIUM CHLORIDE 9 MG/ML
1000 INJECTION, SOLUTION INTRAVENOUS
Refills: 0 | Status: DISCONTINUED | OUTPATIENT
Start: 2019-06-02 | End: 2019-06-02

## 2019-06-02 RX ORDER — ONDANSETRON 8 MG/1
4 TABLET, FILM COATED ORAL EVERY 6 HOURS
Refills: 0 | Status: DISCONTINUED | OUTPATIENT
Start: 2019-06-02 | End: 2019-06-05

## 2019-06-02 RX ORDER — OXYTOCIN 10 UNIT/ML
333.33 VIAL (ML) INJECTION
Qty: 20 | Refills: 0 | Status: DISCONTINUED | OUTPATIENT
Start: 2019-06-02 | End: 2019-06-05

## 2019-06-02 RX ADMIN — SIMETHICONE 80 MILLIGRAM(S): 80 TABLET, CHEWABLE ORAL at 16:56

## 2019-06-02 RX ADMIN — Medication 30 MILLIGRAM(S): at 10:45

## 2019-06-02 RX ADMIN — Medication 400 MILLIGRAM(S): at 13:12

## 2019-06-02 RX ADMIN — Medication 30 MILLIGRAM(S): at 17:05

## 2019-06-02 RX ADMIN — SODIUM CHLORIDE 2000 MILLILITER(S): 9 INJECTION, SOLUTION INTRAVENOUS at 06:25

## 2019-06-02 RX ADMIN — Medication 1000 MILLIGRAM(S): at 13:25

## 2019-06-02 RX ADMIN — Medication 975 MILLIGRAM(S): at 22:06

## 2019-06-02 RX ADMIN — Medication 1000 MILLIUNIT(S)/MIN: at 10:53

## 2019-06-02 RX ADMIN — ENOXAPARIN SODIUM 40 MILLIGRAM(S): 100 INJECTION SUBCUTANEOUS at 22:06

## 2019-06-02 RX ADMIN — SODIUM CHLORIDE 125 MILLILITER(S): 9 INJECTION, SOLUTION INTRAVENOUS at 22:06

## 2019-06-02 RX ADMIN — Medication 30 MILLILITER(S): at 07:14

## 2019-06-02 RX ADMIN — Medication 975 MILLIGRAM(S): at 23:06

## 2019-06-02 RX ADMIN — ONDANSETRON 4 MILLIGRAM(S): 8 TABLET, FILM COATED ORAL at 13:07

## 2019-06-02 RX ADMIN — Medication 30 MILLIGRAM(S): at 16:54

## 2019-06-02 RX ADMIN — SODIUM CHLORIDE 125 MILLILITER(S): 9 INJECTION, SOLUTION INTRAVENOUS at 07:15

## 2019-06-02 RX ADMIN — Medication 30 MILLIGRAM(S): at 11:00

## 2019-06-02 NOTE — DISCHARGE NOTE OB - MEDICATION SUMMARY - MEDICATIONS TO TAKE
I will START or STAY ON the medications listed below when I get home from the hospital:    ibuprofen 600 mg oral tablet  -- 1 tab(s) by mouth every 6 hours, As Needed -for mild pain   -- Do not take this drug if you are pregnant.  It is very important that you take or use this exactly as directed.  Do not skip doses or discontinue unless directed by your doctor.  May cause drowsiness or dizziness.  Obtain medical advice before taking any non-prescription drugs as some may affect the action of this medication.  Take with food or milk.    -- Indication: For Mild pain    oxyCODONE-acetaminophen 5 mg-325 mg oral tablet  -- 1 tab(s) by mouth every 6 hours, As Needed -for moderate pain MDD:4 tabs  -- Caution federal law prohibits the transfer of this drug to any person other  than the person for whom it was prescribed.  May cause drowsiness.  Alcohol may intensify this effect.  Use care when operating dangerous machinery.  This prescription cannot be refilled.  This product contains acetaminophen.  Do not use  with any other product containing acetaminophen to prevent possible liver damage.  Using more of this medication than prescribed may cause serious breathing problems.    -- Indication: For severe pain    Colace 100 mg oral capsule  -- 1 cap(s) by mouth 2 times a day, As Needed -for constipation   -- Medication should be taken with plenty of water.    -- Indication: For Constipation

## 2019-06-02 NOTE — OB PROVIDER H&P - HISTORY OF PRESENT ILLNESS
Pt is a 37yo  at 39w1d presents for scheduled repeat  section and bilateral salpingectomy.  Pregnancy complicated by GDMA1, AMA, and anemia.     OBHx: , FT, pCS, 9lb4oz. , FT, rCS, 8lbs  PMHx/PSHx: CS x2  Meds: PNVs, Fe  NKDA Pt is a 39yo  at 39w1d presents for scheduled repeat  section and bilateral salpingectomy due to previous CS.  Pregnancy complicated by GDMA1, AMA, and anemia.     OBHx: , FT, pCS, 9lb4oz. , FT, rCS, 8lbs  PMHx/PSHx: CS x2  Meds: PNVs, Fe  NKDA

## 2019-06-02 NOTE — DISCHARGE NOTE OB - CARE PROVIDER_API CALL
Evelyn White)  Obstetrics and Gynecology  [address] [place] [address], [address] [place] [address]  Phone: [phone]  Fax: [phone]  Follow Up Time:

## 2019-06-02 NOTE — OB PROVIDER H&P - ATTENDING COMMENTS
Pt seen and evaluated  She desires a repeat c section and BTL  risks and benefits as well as regret risks discussed  pt desires to proceed

## 2019-06-02 NOTE — OB PROVIDER DELIVERY SUMMARY - NSPROVIDERDELIVERYNOTE_OBGYN_ALL_OB_FT
intrauterine pregnancy at 39w, GDMA1, anemia, AMA, history of  section  low transverse repeat  section, bilateral salpingectomy, excision of old scar  surgery uncomplicated  delivery of viable girl, Apgars 9/9, weight 7lbs 7oz    Pathology: old scar, l/r fallopian tube

## 2019-06-02 NOTE — OB PROVIDER H&P - ASSESSMENT
37yo  at 39w1d admitted for repeat  and bilateral salpingectomy.    Admit to OBGYN  Routine Labs  NST  GBS neg  Ancef 2g  rCS + BS

## 2019-06-02 NOTE — DISCHARGE NOTE OB - HOSPITAL COURSE
delivered via  section. She was transferred to postpartum unit. Found to be severely anemic with Hemoglobin 4.7. Transfused 4 units. Recovered appropriately. Upon discharge she is voiding, tolerating PO, ambulating, and pain is controlled.

## 2019-06-02 NOTE — DISCHARGE NOTE OB - CARE PLAN
Principal Discharge DX:	 delivery, delivered, current hospitalization  Goal:	recovery  Assessment and plan of treatment:	delivered via  section. She was transferred to postpartum unit without complications during her stay. Upon discharge she is voiding, tolerating PO, ambulating, and pain is controlled.

## 2019-06-03 ENCOUNTER — APPOINTMENT (OUTPATIENT)
Dept: MATERNAL FETAL MEDICINE | Facility: CLINIC | Age: 39
End: 2019-06-03

## 2019-06-03 LAB
ANISOCYTOSIS BLD QL: SLIGHT — SIGNIFICANT CHANGE UP
BASOPHILS # BLD AUTO: 0 K/UL — SIGNIFICANT CHANGE UP (ref 0–0.2)
BASOPHILS NFR BLD AUTO: 0 % — SIGNIFICANT CHANGE UP (ref 0–2)
BLD GP AB SCN SERPL QL: SIGNIFICANT CHANGE UP
DACRYOCYTES BLD QL SMEAR: SLIGHT — SIGNIFICANT CHANGE UP
EOSINOPHIL # BLD AUTO: 0 K/UL — SIGNIFICANT CHANGE UP (ref 0–0.5)
EOSINOPHIL NFR BLD AUTO: 0 % — SIGNIFICANT CHANGE UP (ref 0–5)
HCT VFR BLD CALC: 13.7 % — CRITICAL LOW (ref 37–47)
HCT VFR BLD CALC: 14.9 % — CRITICAL LOW (ref 37–47)
HGB BLD-MCNC: 4.4 G/DL — CRITICAL LOW (ref 12–16)
HGB BLD-MCNC: 4.7 G/DL — CRITICAL LOW (ref 12–16)
HYPOCHROMIA BLD QL: SLIGHT — SIGNIFICANT CHANGE UP
LYMPHOCYTES # BLD AUTO: 2.1 K/UL — SIGNIFICANT CHANGE UP (ref 1–4.8)
LYMPHOCYTES # BLD AUTO: 6 % — LOW (ref 20–55)
MCHC RBC-ENTMCNC: 21.2 PG — LOW (ref 27–31)
MCHC RBC-ENTMCNC: 31.5 G/DL — LOW (ref 32–36)
MCV RBC AUTO: 67.1 FL — LOW (ref 81–99)
MICROCYTES BLD QL: SIGNIFICANT CHANGE UP
MONOCYTES # BLD AUTO: 2 K/UL — HIGH (ref 0–0.8)
MONOCYTES NFR BLD AUTO: 12 % — HIGH (ref 3–10)
NEUTROPHILS # BLD AUTO: 15.5 K/UL — HIGH (ref 1.8–8)
NEUTROPHILS NFR BLD AUTO: 82 % — HIGH (ref 37–73)
OVALOCYTES BLD QL SMEAR: SLIGHT — SIGNIFICANT CHANGE UP
PLAT MORPH BLD: NORMAL — SIGNIFICANT CHANGE UP
PLATELET # BLD AUTO: 170 K/UL — SIGNIFICANT CHANGE UP (ref 150–400)
POIKILOCYTOSIS BLD QL AUTO: SLIGHT — SIGNIFICANT CHANGE UP
RBC # BLD: 2.22 M/UL — LOW (ref 4.4–5.2)
RBC # FLD: 16.4 % — HIGH (ref 11–15.6)
RBC BLD AUTO: ABNORMAL
TOXIC GRANULES BLD QL SMEAR: PRESENT — SIGNIFICANT CHANGE UP
TYPE + AB SCN PNL BLD: SIGNIFICANT CHANGE UP
WBC # BLD: 18.5 K/UL — HIGH (ref 4.8–10.8)
WBC # FLD AUTO: 18.5 K/UL — HIGH (ref 4.8–10.8)

## 2019-06-03 RX ORDER — OXYCODONE HYDROCHLORIDE 5 MG/1
5 TABLET ORAL
Refills: 0 | Status: DISCONTINUED | OUTPATIENT
Start: 2019-06-03 | End: 2019-06-05

## 2019-06-03 RX ORDER — IBUPROFEN 200 MG
600 TABLET ORAL EVERY 6 HOURS
Refills: 0 | Status: DISCONTINUED | OUTPATIENT
Start: 2019-06-03 | End: 2019-06-05

## 2019-06-03 RX ADMIN — MAGNESIUM HYDROXIDE 30 MILLILITER(S): 400 TABLET, CHEWABLE ORAL at 20:36

## 2019-06-03 RX ADMIN — Medication 975 MILLIGRAM(S): at 10:37

## 2019-06-03 RX ADMIN — OXYCODONE HYDROCHLORIDE 5 MILLIGRAM(S): 5 TABLET ORAL at 22:31

## 2019-06-03 RX ADMIN — Medication 600 MILLIGRAM(S): at 15:05

## 2019-06-03 RX ADMIN — Medication 975 MILLIGRAM(S): at 18:30

## 2019-06-03 RX ADMIN — Medication 25 MILLIGRAM(S): at 10:36

## 2019-06-03 RX ADMIN — Medication 600 MILLIGRAM(S): at 16:00

## 2019-06-03 RX ADMIN — Medication 30 MILLIGRAM(S): at 01:21

## 2019-06-03 RX ADMIN — Medication 30 MILLIGRAM(S): at 02:11

## 2019-06-03 RX ADMIN — Medication 30 MILLIGRAM(S): at 08:53

## 2019-06-03 RX ADMIN — Medication 975 MILLIGRAM(S): at 11:30

## 2019-06-03 RX ADMIN — OXYCODONE HYDROCHLORIDE 5 MILLIGRAM(S): 5 TABLET ORAL at 23:01

## 2019-06-03 RX ADMIN — Medication 975 MILLIGRAM(S): at 23:24

## 2019-06-03 RX ADMIN — Medication 975 MILLIGRAM(S): at 17:38

## 2019-06-03 RX ADMIN — ENOXAPARIN SODIUM 40 MILLIGRAM(S): 100 INJECTION SUBCUTANEOUS at 20:36

## 2019-06-03 RX ADMIN — Medication 975 MILLIGRAM(S): at 05:29

## 2019-06-03 RX ADMIN — SIMETHICONE 80 MILLIGRAM(S): 80 TABLET, CHEWABLE ORAL at 01:21

## 2019-06-03 RX ADMIN — Medication 1 SUPPOSITORY(S): at 23:37

## 2019-06-03 RX ADMIN — SIMETHICONE 80 MILLIGRAM(S): 80 TABLET, CHEWABLE ORAL at 20:36

## 2019-06-03 RX ADMIN — Medication 30 MILLIGRAM(S): at 09:10

## 2019-06-03 RX ADMIN — Medication 975 MILLIGRAM(S): at 04:29

## 2019-06-03 NOTE — PROGRESS NOTE ADULT - ATTENDING COMMENTS
Pt looked slightly pale on my rounds  hct returned low  Complete Blood Count + Automated Diff (06.03.19 @ 06:24)    WBC Count: 18.5 K/uL    RBC Count: 2.22 M/uL    Hematocrit: 14.9: TEST REPEATED    Platelet Count - Automated: 170 K/uL    Abdomen is soft and the vaginal bleeding is minimal to normal  Pt will be transfused  I feel that four units will be more optimal for her Pt looked slightly pale on my rounds  hct returned low  Complete Blood Count + Automated Diff (06.03.19 @ 06:24)    WBC Count: 18.5 K/uL    RBC Count: 2.22 M/uL    Hematocrit: 14.9: TEST REPEATED    Platelet Count - Automated: 170 K/uL    BP diastolic in the 90 range however her pulse remains in the 80s  Abdomen is soft and the vaginal bleeding is minimal to normal  Pt will be transfused  I feel that four units will be more optimal for her

## 2019-06-04 LAB
BASOPHILS # BLD AUTO: 0 K/UL — SIGNIFICANT CHANGE UP (ref 0–0.2)
BASOPHILS NFR BLD AUTO: 0.1 % — SIGNIFICANT CHANGE UP (ref 0–2)
EOSINOPHIL # BLD AUTO: 0 K/UL — SIGNIFICANT CHANGE UP (ref 0–0.5)
EOSINOPHIL NFR BLD AUTO: 0.1 % — SIGNIFICANT CHANGE UP (ref 0–6)
HCT VFR BLD CALC: 24.4 % — LOW (ref 37–47)
HGB BLD-MCNC: 8.3 G/DL — LOW (ref 12–16)
LYMPHOCYTES # BLD AUTO: 1.4 K/UL — SIGNIFICANT CHANGE UP (ref 1–4.8)
LYMPHOCYTES # BLD AUTO: 9.9 % — LOW (ref 20–55)
MCHC RBC-ENTMCNC: 26.9 PG — LOW (ref 27–31)
MCHC RBC-ENTMCNC: 34 G/DL — SIGNIFICANT CHANGE UP (ref 32–36)
MCV RBC AUTO: 79 FL — LOW (ref 81–99)
MONOCYTES # BLD AUTO: 1.6 K/UL — HIGH (ref 0–0.8)
MONOCYTES NFR BLD AUTO: 11.1 % — HIGH (ref 3–10)
NEUTROPHILS # BLD AUTO: 10.5 K/UL — HIGH (ref 1.8–8)
NEUTROPHILS NFR BLD AUTO: 74.8 % — HIGH (ref 37–73)
PLATELET # BLD AUTO: 115 K/UL — LOW (ref 150–400)
RBC # BLD: 3.09 M/UL — LOW (ref 4.4–5.2)
RBC # FLD: 20.5 % — HIGH (ref 11–15.6)
WBC # BLD: 14 K/UL — HIGH (ref 4.8–10.8)
WBC # FLD AUTO: 14 K/UL — HIGH (ref 4.8–10.8)

## 2019-06-04 PROCEDURE — 74018 RADEX ABDOMEN 1 VIEW: CPT | Mod: 26

## 2019-06-04 RX ORDER — IBUPROFEN 200 MG
1 TABLET ORAL
Qty: 28 | Refills: 0
Start: 2019-06-04 | End: 2019-06-10

## 2019-06-04 RX ORDER — DOCUSATE SODIUM 100 MG
1 CAPSULE ORAL
Qty: 14 | Refills: 0
Start: 2019-06-04 | End: 2019-06-10

## 2019-06-04 RX ADMIN — Medication 600 MILLIGRAM(S): at 00:39

## 2019-06-04 RX ADMIN — Medication 975 MILLIGRAM(S): at 05:15

## 2019-06-04 RX ADMIN — Medication 975 MILLIGRAM(S): at 17:45

## 2019-06-04 RX ADMIN — Medication 10 MILLIGRAM(S): at 00:40

## 2019-06-04 RX ADMIN — Medication 600 MILLIGRAM(S): at 01:13

## 2019-06-04 RX ADMIN — OXYCODONE HYDROCHLORIDE 5 MILLIGRAM(S): 5 TABLET ORAL at 02:43

## 2019-06-04 RX ADMIN — Medication 600 MILLIGRAM(S): at 06:18

## 2019-06-04 RX ADMIN — Medication 975 MILLIGRAM(S): at 23:07

## 2019-06-04 RX ADMIN — Medication 600 MILLIGRAM(S): at 12:23

## 2019-06-04 RX ADMIN — Medication 100 MILLIGRAM(S): at 02:43

## 2019-06-04 RX ADMIN — Medication 975 MILLIGRAM(S): at 16:52

## 2019-06-04 RX ADMIN — Medication 975 MILLIGRAM(S): at 06:00

## 2019-06-04 RX ADMIN — ENOXAPARIN SODIUM 40 MILLIGRAM(S): 100 INJECTION SUBCUTANEOUS at 23:05

## 2019-06-04 RX ADMIN — Medication 600 MILLIGRAM(S): at 06:59

## 2019-06-04 RX ADMIN — OXYCODONE HYDROCHLORIDE 5 MILLIGRAM(S): 5 TABLET ORAL at 03:30

## 2019-06-04 RX ADMIN — Medication 975 MILLIGRAM(S): at 00:00

## 2019-06-04 RX ADMIN — Medication 600 MILLIGRAM(S): at 13:20

## 2019-06-04 NOTE — CHART NOTE - NSCHARTNOTEFT_GEN_A_CORE
Pt is s/p c section 6/2/19 with a drop in her hct   She received 4 units of prbc.  Pt is slightly distended with an exam more consistent with air rather than bleeding  Physically, her color looks significantly better than this morning on rounds  she is verbal   she is not passing much flatus  I will get an abdominal xray as I start rectal suppositories to help with passage of flatus

## 2019-06-04 NOTE — PROGRESS NOTE ADULT - ATTENDING COMMENTS
Pt looks significantly better than last night  BM+ as well as flatus  ambulatory  we will continue the current meds to help with motility  discharge possible tomorrow, but not considering an early day two discharge

## 2019-06-05 VITALS
TEMPERATURE: 99 F | DIASTOLIC BLOOD PRESSURE: 67 MMHG | HEART RATE: 92 BPM | SYSTOLIC BLOOD PRESSURE: 113 MMHG | RESPIRATION RATE: 18 BRPM

## 2019-06-05 RX ORDER — TETANUS TOXOID, REDUCED DIPHTHERIA TOXOID AND ACELLULAR PERTUSSIS VACCINE, ADSORBED 5; 2.5; 8; 8; 2.5 [IU]/.5ML; [IU]/.5ML; UG/.5ML; UG/.5ML; UG/.5ML
0.5 SUSPENSION INTRAMUSCULAR ONCE
Refills: 0 | Status: COMPLETED | OUTPATIENT
Start: 2019-06-05 | End: 2019-06-05

## 2019-06-05 RX ADMIN — Medication 975 MILLIGRAM(S): at 05:57

## 2019-06-05 RX ADMIN — Medication 975 MILLIGRAM(S): at 06:31

## 2019-06-05 RX ADMIN — Medication 600 MILLIGRAM(S): at 13:00

## 2019-06-05 RX ADMIN — Medication 600 MILLIGRAM(S): at 13:30

## 2019-06-05 RX ADMIN — Medication 975 MILLIGRAM(S): at 00:02

## 2019-06-05 RX ADMIN — TETANUS TOXOID, REDUCED DIPHTHERIA TOXOID AND ACELLULAR PERTUSSIS VACCINE, ADSORBED 0.5 MILLILITER(S): 5; 2.5; 8; 8; 2.5 SUSPENSION INTRAMUSCULAR at 05:56

## 2019-06-05 NOTE — PROGRESS NOTE ADULT - SUBJECTIVE AND OBJECTIVE BOX
38y year old  now POD#3 s/p  section at 39wks gestation.     No acute overnight events. Pain well controlled, reports significant improvement in abdominal pain compared to yesterday.   Denies any nausea, vomiting, worsening abdominal pain.   Patient is ambulating, +voiding, +Flatus, +BM  Reports minimal lochia.   +breast feeding, -breast tenderness    VS:   Vital Signs Last 24 Hrs  T(C): 37.1 (2019 20:05), Max: 37.1 (2019 20:05)  T(F): 98.8 (2019 20:05), Max: 98.8 (2019 20:05)  HR: 87 (2019 20:05) (86 - 92)  BP: 100/61 (2019 20:05) (100/61 - 112/71)  RR: 18 (2019 20:05) (18 - 18)  SpO2: 98% (2019 20:05) (98% - 100%)    Physical Exam:  General: NAD  Abdomen: soft, ND, firm fundus palpated at the umbilicus. Incision clean, dry, and intact.  Ext: nontender lower extremities, bilaterally.     Labs:                        8.3    14.0  )-----------( 115      ( 2019 02:54 )             24.4
38y year old  now POD#2 s/p  section at 39wks gestation.     No acute overnight events. Pain well controlled.  Denies dizziness, lightheadedness, or feeling faint when ambulating or at rest.   Patient is ambulating, +voiding, +Flatus, +BM  Reports minimal lochia.   +breast feeding, -breast tenderness    VS:   Vital Signs Last 24 Hrs  T(C): 36.8 (2019 05:00), Max: 37.6 (2019 22:22)  T(F): 98.3 (2019 05:00), Max: 99.7 (2019 22:22)  HR: 86 (2019 05:00) (83 - 104)  BP: 110/69 (2019 05:00) (83/50 - 111/72)  RR: 18 (2019 05:00) (14 - 20)  SpO2: 100% (2019 05:00) (99% - 100%)    Physical Exam:  General: NAD  Abdomen: soft, moderately distended, firm fundus palpated at the umbilicus. Incision clean, dry, and intact.  Ext: nontender lower extremities, bilaterally.     Labs:                        8.3    14.0  )-----------( 115      ( 2019 02:54 )             24.4     ABDOMINAL XRAY: read pending
Patient is a 37yo  now POD#1 s/p  section    S:    No acute events overnight.  Pt seen and examined at bedside. Pt doing well.  Has no complaints.  Pain well controlled on current regiment.  Pt ambulating, tolerating PO, voiding w/o difficulty, +flatus/-BM.    O:    T(C): 37 (19 @ 04:43), Max: 37.3 (19 @ 16:00)  HR: 83 (19 @ 04:43) (57 - 83)  BP: 92/62 (19 @ 04:51) (88/57 - 115/71)  RR: 18 (19 @ 04:43) (14 - 19)  SpO2: 97% (19 @ 04:43) (97% - 100%)  Wt(kg): --    Physical Exam  Breast: NT, non-engorged  Abdomen:  soft, NT, ND, BS+, bandage dressing removed, incision c/d/i  Uterus:  firm below umbilicus  VE:  expectant lochia  Ext:  NT, nonedematous

## 2019-06-05 NOTE — PROGRESS NOTE ADULT - ASSESSMENT
38y year old  now POD#3 s/p  section at 39wks gestation, in stable condition.
38y year old  now POD#2 s/p  section at 39wks gestation, in stable condition.
A/P:  Patient is a 37yo  now POD#1 s/p  section   -Stable  -Voiding, tolerating PO, bowel function nml   -Advance care as tolerated   -Continue routine postpartum/postoperative care and education.  -DVT: Lovenox + SCDs  -Pt encouraged to increase ambulation and PO intake.  -D/C POD 2,3,4 if meeting all expected milestones.

## 2019-06-05 NOTE — PROGRESS NOTE ADULT - PROBLEM SELECTOR PLAN 1
Meeting all post partum/op milestones.   Ready for discharge.
Continue routine post-partum care. Continue to encourage ambulation and breast feeding. Pain management PRN.  - continue medication for gastric motility  - continue assessment for symptoms of anemia

## 2019-06-08 LAB — SURGICAL PATHOLOGY STUDY: SIGNIFICANT CHANGE UP

## 2019-07-10 PROCEDURE — P9016: CPT

## 2019-07-10 PROCEDURE — 36415 COLL VENOUS BLD VENIPUNCTURE: CPT

## 2019-07-10 PROCEDURE — 59050 FETAL MONITOR W/REPORT: CPT

## 2019-07-10 PROCEDURE — 59025 FETAL NON-STRESS TEST: CPT

## 2019-07-10 PROCEDURE — 85014 HEMATOCRIT: CPT

## 2019-07-10 PROCEDURE — 74018 RADEX ABDOMEN 1 VIEW: CPT

## 2019-07-10 PROCEDURE — 86901 BLOOD TYPING SEROLOGIC RH(D): CPT

## 2019-07-10 PROCEDURE — T1013: CPT

## 2019-07-10 PROCEDURE — 82962 GLUCOSE BLOOD TEST: CPT

## 2019-07-10 PROCEDURE — 86923 COMPATIBILITY TEST ELECTRIC: CPT

## 2019-07-10 PROCEDURE — G0463: CPT

## 2019-07-10 PROCEDURE — 76815 OB US LIMITED FETUS(S): CPT

## 2019-07-10 PROCEDURE — 86900 BLOOD TYPING SEROLOGIC ABO: CPT

## 2019-07-10 PROCEDURE — 88302 TISSUE EXAM BY PATHOLOGIST: CPT

## 2019-07-10 PROCEDURE — 88304 TISSUE EXAM BY PATHOLOGIST: CPT

## 2019-07-10 PROCEDURE — 86850 RBC ANTIBODY SCREEN: CPT

## 2019-07-10 PROCEDURE — 85018 HEMOGLOBIN: CPT

## 2019-07-10 PROCEDURE — 36430 TRANSFUSION BLD/BLD COMPNT: CPT

## 2019-07-10 PROCEDURE — 85027 COMPLETE CBC AUTOMATED: CPT

## 2019-07-10 PROCEDURE — 90715 TDAP VACCINE 7 YRS/> IM: CPT

## 2020-04-16 NOTE — VITALS
-- DO NOT REPLY / DO NOT REPLY ALL --  -- Message is from the [facility]--    COVID-19 Universal Screening: Negative    General Patient Message      Reason for Call: Patient is scheduled for an annual on 05/05/2020. Patient would like to push the appointment back. Please call to reschedule.    Caller Information       Type Contact Phone    04/16/2020 08:47 AM Phone (Incoming) Evelina Encarnacion (Self) [phone] (H)          Alternative phone number:     Turnaround time given to caller:   \"This message will be sent to [state Provider's name]. The clinical team will fulfill your request as soon as they review your message.\"     [LMP (date): ___] : LMP was on [unfilled] [LAURI by LMP (date): ___] : The calculated LAURI by LMP is [unfilled] [US Date: ___] : ultrasound performed on [unfilled]. [By LMP] : this is the final LAURI [GA= ___ Days] : and [unfilled] day(s) [GA= ___ Weeks] : Results were GA of [unfilled] weeks [LAURI by US (date): ___] : The calculated LAURI by US is [unfilled] [GA =___ Weeks] : which calculates to a GA of [unfilled] weeks

## 2020-11-06 NOTE — OB RN DELIVERY SUMMARY - NS_DELIVERYASSIST1_OBGYN_ALL_OB_FT
[FreeTextEntry3] : pt seen and plan discussed with NP.  Transfusion today. continue chlation. due for scans in march for iron overload. follow ferritin.  Trace protein in UA- check urine protein/craet at next visit.  if proteinuria worsens may need renal consultation for recs on further assessment. tamara

## 2021-02-24 NOTE — OB RN PATIENT PROFILE - TEMPERATURE IN FAHRENHEIT (DEGREES F)
97.9 Dapsone Counseling: I discussed with the patient the risks of dapsone including but not limited to hemolytic anemia, agranulocytosis, rashes, methemoglobinemia, kidney failure, peripheral neuropathy, headaches, GI upset, and liver toxicity.  Patients who start dapsone require monitoring including baseline LFTs and weekly CBCs for the first month, then every month thereafter.  The patient verbalized understanding of the proper use and possible adverse effects of dapsone.  All of the patient's questions and concerns were addressed.

## 2021-05-11 NOTE — OB RN INTRAOPERATIVE NOTE - NS_INTERMITTENTPNEUMATICCOMPRESSIONSTART_OBGYN_ALL_OB_DT
Keep the leg elevated when at rest      Follow-up appointment with your Doctor in 1-2 weeks      If symptoms worsen please be seen in one of our clinics right away      Make appointment to see the folks at the vascular clinic to discuss if new therapies/devices are available to help with your condition   02-Jun-2019 08:04

## 2022-02-23 NOTE — OB NEONATOLOGY/PEDIATRICIAN DELIVERY SUMMARY - NSPEDSNEONOTESA_OBGYN_ALL_OB_FT
Dr. White requested me to attend R C/S at 39.1 weeks. The mother is 35 y/o, , O+, RI, GBS, HIV, RPR, & HBsAg are NR. The mother is GDM-A1 ( Diet), a/c 78mg%  L & D: Clear fluid, suctioned and dried, A/S   Asst: full term appropriate for gestational age, BG, R C/S, IDM  Plan: Observe in transition, F/U a/c if stable admit to NBN, inform PMD Dr. White requested me to attend R C/S at 39.1 weeks. The mother is 37 y/o, , O+, RI, GBS, HIV, RPR, & HBsAg are NR. The mother is GDM-A1 ( Diet), a/c 78mg%  L & D: Clear fluid, CAN x1, suctioned and dried, A/S , BW: 3360gm (7-7)  Asst: full term appropriate for gestational age, BG, R C/S, IDM  Plan: Observe in transition, F/U a/c if stable admit to NBN, inform PMD Patient expressed no known problems or needs

## 2022-03-25 NOTE — OB RN PATIENT PROFILE - BLOOD TYPED: DATE, OB PROFILE
Next Appt 5/11/22  Last Appt 2/11/22    Refill request for   Disp Refills Start End     warfarin (COUMADIN) 5 MG tablet 30 tablet 3 3/25/2022 4/24/2022    Sig: TAKE 5 MG BY MOUTH EVERY SUNDAY, TUESDAY AND THURSDAY. TAKE 2.5 MG EVERY MONDAY, WEDNESDAY, FRIDAY AND SATURDAY        Refill unable to be completed per standing protocol due to; REFILLS AVAILABLE AT PHARMACY, REFILLED BY OTHER MEANS  Orders pended, and routed to provider for approval.  Please route any notes back to your nursing pool via patient call, NOT Rx Auth.    Thank you, Refill Center Staff    INR (no units)   Date Value   03/18/2022 2.4       
23-Apr-2019

## 2022-10-01 NOTE — OB RN TRIAGE NOTE - NS_FETALMOVEMENT_OBGYN_ALL_OB
Present, unchanged You can access the FollowMyHealth Patient Portal offered by [facility] by registering at the following website: [email or web address]. By joining [email or web address]’s FollowMyHealth portal, you will also be able to view your health information using other applications (apps) compatible with our system.

## 2023-06-22 NOTE — ED ADULT NURSE NOTE - CHIEF COMPLAINT
Nelly Monreal has been awake, alert, and visible intermittently out in the milieu. Pt rests in room at intervals. Pt refused supper because he did not like what was on his tray. Pt stated that "I had no motivation to get out of bed" this AM to order his menu selection for supper. Encouraged pt to set a goal for himself for tomorrow to awaken in AM  for vital signs, order his menu, and eat breakfast.  Pt attended and participated in evening nursing group, wrap up group, and had evening snack. No behavioral issues with male peer noted. Pt remains fixated on discharge, preoccupied, and easily distracted in conversation with staff. Compliant with scheduled meds. Continue to monitor/assess for any changes. The patient is a 38y Female complaining of urinary symptoms.

## 2025-01-03 NOTE — OB RN PATIENT PROFILE - THE IMPORTANCE OF THE CORRECT PLACEMENT OF THE INFANT AND THE PARENT’S ABILITY TO ASSESS THE INFANT'S SKIN COLOR WHILE PLACED ON SKIN TO SKIN HAS BEEN REINFORCED.
Returned patient's call with questions regarding hem/onc referral. Reviewed surgical pathology with patient and answered questions. Explained that based on final pathology hem/onc referral was placed for adjuvant treatment recommendations.   
Statement Selected